# Patient Record
Sex: MALE | Race: WHITE | NOT HISPANIC OR LATINO | Employment: FULL TIME | URBAN - METROPOLITAN AREA
[De-identification: names, ages, dates, MRNs, and addresses within clinical notes are randomized per-mention and may not be internally consistent; named-entity substitution may affect disease eponyms.]

---

## 2021-04-19 ENCOUNTER — THERAPY VISIT (OUTPATIENT)
Dept: PHYSICAL THERAPY | Facility: CLINIC | Age: 36
End: 2021-04-19
Payer: COMMERCIAL

## 2021-04-19 DIAGNOSIS — G89.29 CHRONIC MIDLINE LOW BACK PAIN WITHOUT SCIATICA: ICD-10-CM

## 2021-04-19 DIAGNOSIS — M54.50 CHRONIC MIDLINE LOW BACK PAIN WITHOUT SCIATICA: ICD-10-CM

## 2021-04-19 PROCEDURE — 97161 PT EVAL LOW COMPLEX 20 MIN: CPT | Mod: GP | Performed by: PHYSICAL THERAPIST

## 2021-04-19 PROCEDURE — 97110 THERAPEUTIC EXERCISES: CPT | Mod: GP | Performed by: PHYSICAL THERAPIST

## 2021-04-19 NOTE — LETTER
"[facility]  [address]  [facility] [address]  [phone]    2021    Re: Adriano Khanna   :   1985  MRN:  [id number]   REFERRING PHYSICIAN:   [facility]    Date of Initial Evaluation:  21  Visits: 1  Rxs Used: 1  Reason for Referral:  Chronic midline low back pain without sciatica    Physical Therapy Initial Evaluation  Subjective:  The history is provided by the patient. No  was used.   Patient Health History  Adriano Khanna being seen for low bakc pain.   Problem began: 2021.   Problem occurred: shoveling snow   Pain is reported as 4/10 on pain scale.  General health as reported by patient is poor.  Pertinent medical history includes: overweight and smoking.   Red flags:  None as reported by patient.  Medical allergies: none.   Surgeries include:  None.    Current medications:  Muscle relaxants.    Current occupation is .   Primary job tasks include:  Computer work, driving, lifting/carrying, prolonged sitting, prolonged standing, pushing/pulling and repetitive tasks.                Therapist Generated HPI Evaluation  Problem details: He has had recurrent back pain.  Stated in 2021 with shovel and felt a \"pop\" in his low back.  He had some pain instantly, but pain progressed over the next couple of hours.  That pain lasted 1 week and then went away.  About 2 weeks ago the pain return for unknown reason.  He has been seeing chiro with mild relief.  He feels better if he is able to move around and be active.  Pain is worse with less activity, bending, sitting >10 min.  No issues with sleeping.  Mild pain getting out of bed in AM..    Type of problem:  Lumbar.  This is a recurrent condition.  Condition occurred with:  Insidious onset.  Where condition occurred: for unknown reasons.  Patient reports pain:  Lower lumbar " spine.  Pain is described as burning, sharp and aching and is intermittent.  Pain radiates to:  Gluteals left, gluteals right and thigh right. Pain is worse in the A.M..  Since onset symptoms are unchanged.  Associated symptoms:  Loss of motion/stiffness, tingling and loss of strength. Symptoms are exacerbated by bending, lifting, certain positions, twisting and sitting  and relieved by activity/movement.  Re: Adriano Khanna   :   1985  Page 2    Previous treatment includes chiropractic. There was mild improvement following previous treatment.  Restrictions due to condition include:  Working in normal job without restrictions.  Barriers include:  Stairs.  Pt goal: to improve sitting tolerance                 Objective:  Adriano Khanna , : 1985, MRN: [id number]  Physical Therapy Objective FindingsSubjective information, goals, clinical impression, daily documentation and other information found in EPISODES tab.  Objective:     Lumbar Pain  Posture: initially after sitting: significant  lumbar shift to R, after standing a couple of min: Gait:  After sitting: significant wt lumbar shift shoulders to R, after a couple of minutes, slowed but no lumbar shift  Lumbar Range of Motion:  Flexion                                               60% - pain low back                                                                                                                            Extension 80%   Right Side Bending 70% - pain low back   Left Side Bending 90% - pain low back   Repeated extension- standing Decreasing pain, improving lumbar ext, SB B and flex after 15 reps   Repeated flexion- standing Increasing tightness in low back, loss of lumbar ext and R SB after 10 reps     Pelvic screen:                                                                         Positive                                            Negative                                             Standing Forward Bend  x    Naresh(March)  x   Supine to sit     Sacroiliac provocation test     Pubic symphysis provocation            -resisted hip add at 47     Other:       Hip Screen:                                                                  Positive                                             Negative                                             Hip ROM  x   Scour  x   JEISON  x   FADIR  x   Other:     Manual Muscle Testing (graded 0-5, measured at 0 degrees unless otherwise noted):                                                                              Right                                  Left                                                     Transversus Abdominus     -Rodri Leg Lowering (deg)     Hip Flex L2 4- (+ low back) 4- (+ low back)   Hip Abd     Hip Add     Hip Ext 4- (+ low back) 4- (+ low back)   Knee Flex 5 5   Knee Ext L3 5 5   Ankle Dorsiflexion L4 5 5   Great Toe Extension L5 5 5   Ankle Plantar Flexion S1 5 5   Other:     (+ mild pain, ++ moderate pain, +++ severe pain)    Special Tests:                                                                     Positive                                             Negative                                             Sign of Buttock  x   SLR  x   Trev Test  x   Ely Test  x   Prone instability Test     Crossover SLR     Repeated extension prone Improving lumbar extension, decreased pain with PA L4 after rx    Other:       Flexibility:                                                              Right                                                 Left                                                      Hamstring SLR 75 SLR 75   Hip flexor normal normal   Quadricep normal normal   Zhang's     piriformis     Other:            Segmental Mobility: pain L4>L5>L3  IF symptoms past hip, must do neuro testing  Dermatome/Sensory  Testing: normal to light touch BLE  Reflex Testing:                                                                 Right                                                   Left                                                     Patellar Tendon normal normal   Achilles Tendon normal normal   Babinski       Assessment/Plan:    Patient is a 36 year old male with lumbar complaints.    Patient has the following significant findings with corresponding treatment plan.                Diagnosis 1:  Low back pain consistent with disc irritation L4-L5  Pain -  hot/cold therapy, manual therapy, self management, education, directional preference exercise   Re: Adriano Khanna   :   1985  Page 4    and home program  Decreased ROM/flexibility - manual therapy, therapeutic exercise, therapeutic activity and home program  Decreased strength - therapeutic exercise, therapeutic activities and home program  Decreased function - therapeutic activities and home program  Impaired posture - neuro re-education, therapeutic activities and home program    Therapy Evaluation Codes:   1) History comprised of:   Personal factors that impact the plan of care:      Profession.    Comorbidity factors that impact the plan of care are:      Overweight.     Medications impacting care: Muscle relaxant.  2) Examination of Body Systems comprised of:   Body structures and functions that impact the plan of care:      Lumbar spine.   Activity limitations that impact the plan of care are:      Bending, Driving, Dressing, Lifting, Reading/Computer work and Sitting.  3) Clinical presentation characteristics are:   Evolving/Changing.  4) Decision-Making    Low complexity using standardized patient assessment instrument and/or measureable assessment of functional outcome.  Cumulative Therapy Evaluation is: Low complexity.  Previous and current functional limitations:  (See Goal Flow Sheet for this information)    Short term and Long term goals: (See Goal Flow Sheet for this information)   Communication ability:  Patient appears to be able to clearly communicate and understand verbal and  written communication and follow directions correctly.  Treatment Explanation - The following has been discussed with the patient:   RX ordered/plan of care  Anticipated outcomes  Possible risks and side effects  This patient would benefit from PT intervention to resume normal activities.   Rehab potential is good.  Frequency:  1 X week, once daily  Duration:  for 6 weeks  Discharge Plan:  Achieve all LTG.  Independent in home treatment program.  Reach maximal therapeutic benefit.    Thank you for your referral.    INQUIRIES  Therapist: Keaton Hua,PT, DPT, [address]  [facility] [address]  Phone: [phone]  Fax: [phone]

## 2021-04-19 NOTE — PROGRESS NOTES
"Physical Therapy Initial Evaluation  Subjective:  The history is provided by the patient. No  was used.   Patient Health History  Adriano Khanna being seen for low bakc pain.     Problem began: 2/19/2021.   Problem occurred: shoveling snow   Pain is reported as 4/10 on pain scale.  General health as reported by patient is poor.  Pertinent medical history includes: overweight and smoking.   Red flags:  None as reported by patient.  Medical allergies: none.   Surgeries include:  None.    Current medications:  Muscle relaxants.    Current occupation is .   Primary job tasks include:  Computer work, driving, lifting/carrying, prolonged sitting, prolonged standing, pushing/pulling and repetitive tasks.                  Therapist Generated HPI Evaluation  Problem details: He has had recurrent back pain.  Stated in Feb 2021 with shovel and felt a \"pop\" in his low back.  He had some pain instantly, but pain progressed over the next couple of hours.  That pain lasted 1 week and then went away.  About 2 weeks ago the pain return for unknown reason.  He has been seeing chiro with mild relief.  He feels better if he is able to move around and be active.  Pain is worse with less activity, bending, sitting >10 min.  No issues with sleeping.  Mild pain getting out of bed in AM..         Type of problem:  Lumbar.    This is a recurrent condition.  Condition occurred with:  Insidious onset.  Where condition occurred: for unknown reasons.  Patient reports pain:  Lower lumbar spine.  Pain is described as burning, sharp and aching and is intermittent.  Pain radiates to:  Gluteals left, gluteals right and thigh right. Pain is worse in the A.M..  Since onset symptoms are unchanged.  Associated symptoms:  Loss of motion/stiffness, tingling and loss of strength. Symptoms are exacerbated by bending, lifting, certain positions, twisting and sitting  and relieved by " activity/movement.    Previous treatment includes chiropractic. There was mild improvement following previous treatment.  Restrictions due to condition include:  Working in normal job without restrictions.  Barriers include:  Stairs.       Pt goal: to improve sitting tolerance                 Objective:  System    Physical Exam    General     ROS  Adriano Khanna , : 1985, MRN: [id number]    Physical Therapy Objective Findings  Subjective information, goals, clinical impression, daily documentation and other information found in EPISODES tab.  Objective:     Lumbar Pain    Posture: initially after sitting: significant  lumbar shift to R, after standing a couple of min:   Gait:  After sitting: significant wt lumbar shift shoulders to R, after a couple of minutes, slowed but no lumbar shift  Lumbar Range of Motion:  Flexion                                               60% - pain low back                                                                                                                            Extension 80%   Right Side Bending 70% - pain low back   Left Side Bending 90% - pain low back   Repeated extension- standing Decreasing pain, improving lumbar ext, SB B and flex after 15 reps   Repeated flexion- standing Increasing tightness in low back, loss of lumbar ext and R SB after 10 reps     Pelvic screen:                                                                         Positive                                            Negative                                             Standing Forward Bend  x   Gillet(March)  x   Supine to sit     Sacroiliac provocation test     Pubic symphysis provocation            -resisted hip add at 47     Other:       Hip Screen:                                                                  Positive                                             Negative                                             Hip ROM  x   Scour  x   JEISON  x   FADIR  x   Other:      Manual Muscle Testing (graded 0-5, measured at 0 degrees unless otherwise noted):                                                                              Right                                  Left                                                     Transversus Abdominus     -Rodri Leg Lowering (deg)     Hip Flex L2 4- (+ low back) 4- (+ low back)   Hip Abd     Hip Add     Hip Ext 4- (+ low back) 4- (+ low back)   Knee Flex 5 5   Knee Ext L3 5 5   Ankle Dorsiflexion L4 5 5   Great Toe Extension L5 5 5   Ankle Plantar Flexion S1 5 5   Other:     (+ mild pain, ++ moderate pain, +++ severe pain)    Special Tests:                                                                     Positive                                             Negative                                             Sign of Buttock  x   SLR  x   Trev Test  x   Ely Test  x   Prone instability Test     Crossover SLR     Repeated extension prone Improving lumbar extension, decreased pain with PA L4 after rx    Other:       Flexibility:                                                              Right                                                 Left                                                      Hamstring SLR 75 SLR 75   Hip flexor normal normal   Quadricep normal normal   Zhang's     piriformis     Other:            Segmental Mobility: pain L4>L5>L3    IF symptoms past hip, must do neuro testing    Dermatome/Sensory  Testing: normal to light touch BLE  Reflex Testing:                                                                 Right                                                  Left                                                     Patellar Tendon normal normal   Achilles Tendon normal normal   Babinski         Assessment/Plan:    Patient is a 36 year old male with lumbar complaints.    Patient has the following significant findings with corresponding treatment plan.                Diagnosis 1:  Low back pain consistent with  disc irritation L4-L5  Pain -  hot/cold therapy, manual therapy, self management, education, directional preference exercise and home program  Decreased ROM/flexibility - manual therapy, therapeutic exercise, therapeutic activity and home program  Decreased strength - therapeutic exercise, therapeutic activities and home program  Decreased function - therapeutic activities and home program  Impaired posture - neuro re-education, therapeutic activities and home program    Therapy Evaluation Codes:   1) History comprised of:   Personal factors that impact the plan of care:      Profession.    Comorbidity factors that impact the plan of care are:      Overweight.     Medications impacting care: Muscle relaxant.  2) Examination of Body Systems comprised of:   Body structures and functions that impact the plan of care:      Lumbar spine.   Activity limitations that impact the plan of care are:      Bending, Driving, Dressing, Lifting, Reading/Computer work and Sitting.  3) Clinical presentation characteristics are:   Evolving/Changing.  4) Decision-Making    Low complexity using standardized patient assessment instrument and/or measureable assessment of functional outcome.  Cumulative Therapy Evaluation is: Low complexity.    Previous and current functional limitations:  (See Goal Flow Sheet for this information)    Short term and Long term goals: (See Goal Flow Sheet for this information)     Communication ability:  Patient appears to be able to clearly communicate and understand verbal and written communication and follow directions correctly.  Treatment Explanation - The following has been discussed with the patient:   RX ordered/plan of care  Anticipated outcomes  Possible risks and side effects  This patient would benefit from PT intervention to resume normal activities.   Rehab potential is good.    Frequency:  1 X week, once daily  Duration:  for 6 weeks  Discharge Plan:  Achieve all LTG.  Independent in home  treatment program.  Reach maximal therapeutic benefit.    Please refer to the daily flowsheet for treatment today, total treatment time and time spent performing 1:1 timed codes.     Keaton Hua,PT, DPT, OCS

## 2021-04-29 ENCOUNTER — THERAPY VISIT (OUTPATIENT)
Dept: PHYSICAL THERAPY | Facility: CLINIC | Age: 36
End: 2021-04-29
Payer: COMMERCIAL

## 2021-04-29 DIAGNOSIS — M54.50 CHRONIC MIDLINE LOW BACK PAIN WITHOUT SCIATICA: ICD-10-CM

## 2021-04-29 DIAGNOSIS — G89.29 CHRONIC MIDLINE LOW BACK PAIN WITHOUT SCIATICA: ICD-10-CM

## 2021-04-29 PROCEDURE — 97112 NEUROMUSCULAR REEDUCATION: CPT | Mod: GP | Performed by: PHYSICAL THERAPIST

## 2021-04-29 PROCEDURE — 97110 THERAPEUTIC EXERCISES: CPT | Mod: GP | Performed by: PHYSICAL THERAPIST

## 2021-04-29 PROCEDURE — 97140 MANUAL THERAPY 1/> REGIONS: CPT | Mod: GP | Performed by: PHYSICAL THERAPIST

## 2021-05-06 ENCOUNTER — THERAPY VISIT (OUTPATIENT)
Dept: PHYSICAL THERAPY | Facility: CLINIC | Age: 36
End: 2021-05-06
Payer: COMMERCIAL

## 2021-05-06 DIAGNOSIS — G89.29 CHRONIC MIDLINE LOW BACK PAIN WITHOUT SCIATICA: ICD-10-CM

## 2021-05-06 DIAGNOSIS — M54.50 CHRONIC MIDLINE LOW BACK PAIN WITHOUT SCIATICA: ICD-10-CM

## 2021-05-06 PROCEDURE — 97110 THERAPEUTIC EXERCISES: CPT | Mod: GP | Performed by: PHYSICAL THERAPIST

## 2021-05-06 PROCEDURE — 97140 MANUAL THERAPY 1/> REGIONS: CPT | Mod: GP | Performed by: PHYSICAL THERAPIST

## 2021-05-06 PROCEDURE — 97112 NEUROMUSCULAR REEDUCATION: CPT | Mod: GP | Performed by: PHYSICAL THERAPIST

## 2021-05-14 ENCOUNTER — THERAPY VISIT (OUTPATIENT)
Dept: PHYSICAL THERAPY | Facility: CLINIC | Age: 36
End: 2021-05-14
Payer: COMMERCIAL

## 2021-05-14 DIAGNOSIS — M54.50 CHRONIC MIDLINE LOW BACK PAIN WITHOUT SCIATICA: ICD-10-CM

## 2021-05-14 DIAGNOSIS — G89.29 CHRONIC MIDLINE LOW BACK PAIN WITHOUT SCIATICA: ICD-10-CM

## 2021-05-14 PROCEDURE — 97110 THERAPEUTIC EXERCISES: CPT | Performed by: PHYSICAL THERAPY ASSISTANT

## 2021-05-14 PROCEDURE — 97112 NEUROMUSCULAR REEDUCATION: CPT | Performed by: PHYSICAL THERAPY ASSISTANT

## 2021-05-28 ENCOUNTER — THERAPY VISIT (OUTPATIENT)
Dept: PHYSICAL THERAPY | Facility: CLINIC | Age: 36
End: 2021-05-28
Payer: COMMERCIAL

## 2021-05-28 DIAGNOSIS — G89.29 CHRONIC MIDLINE LOW BACK PAIN WITHOUT SCIATICA: ICD-10-CM

## 2021-05-28 DIAGNOSIS — M54.50 CHRONIC MIDLINE LOW BACK PAIN WITHOUT SCIATICA: ICD-10-CM

## 2021-05-28 PROCEDURE — 97530 THERAPEUTIC ACTIVITIES: CPT | Mod: GP | Performed by: PHYSICAL THERAPIST

## 2021-05-28 PROCEDURE — 97140 MANUAL THERAPY 1/> REGIONS: CPT | Mod: GP | Performed by: PHYSICAL THERAPIST

## 2021-05-28 PROCEDURE — 97110 THERAPEUTIC EXERCISES: CPT | Mod: GP | Performed by: PHYSICAL THERAPIST

## 2021-06-11 ENCOUNTER — THERAPY VISIT (OUTPATIENT)
Dept: PHYSICAL THERAPY | Facility: CLINIC | Age: 36
End: 2021-06-11
Payer: COMMERCIAL

## 2021-06-11 DIAGNOSIS — M54.50 CHRONIC MIDLINE LOW BACK PAIN WITHOUT SCIATICA: ICD-10-CM

## 2021-06-11 DIAGNOSIS — G89.29 CHRONIC MIDLINE LOW BACK PAIN WITHOUT SCIATICA: ICD-10-CM

## 2021-06-11 PROCEDURE — 97110 THERAPEUTIC EXERCISES: CPT | Mod: GP | Performed by: PHYSICAL THERAPY ASSISTANT

## 2021-06-11 PROCEDURE — 97140 MANUAL THERAPY 1/> REGIONS: CPT | Mod: GP | Performed by: PHYSICAL THERAPY ASSISTANT

## 2021-06-11 PROCEDURE — 97530 THERAPEUTIC ACTIVITIES: CPT | Mod: GP | Performed by: PHYSICAL THERAPY ASSISTANT

## 2021-06-11 NOTE — LETTER
[facility]  [address]  [employer] [address] [phone]    2021    Re: Smith Khanna   :   1985  MRN:  [id number]   REFERRING PHYSICIAN:   Mckay MARTINEZ [facility]    Date of Initial Evaluation:  21  Visits: 6  Rxs Used: 6  Reason for Referral:  Chronic midline low back pain without sciatica    DISCHARGE REPORT    Progress reporting period is from 21 to 6/10/21.      SUBJECTIVE    Subjective changes noted by patient:   Pt has been seen for 6 PT sessions, reporting 90% improved. Symptoms of tightness in low back, no longer with radicular symptoms.    Current Pain level: 1/10    Initial Pain level: 9/10   Changes in function:  Yes (See Goal flowsheet attached for changes in current functional level)     Adverse reaction to treatment or activity: None     OBJECTIVE    Changes noted in objective findings:    LROM: reach to ankles, ext min loss, R/L SG and SG WNL.   Abdominal strength 4-/5.   Pt able to perform crate lift test up to 60# without pain. Prompting needed for correct sitting posture, once corrected pt maintained.   Oswestry has improved from 34-10%, Joon has improved from 6 to 2 (low risk).      ASSESSMENT/PLAN    Updated problem list and treatment plan: Diagnosis 1:  Chronic LBP  Pain -  home program  Decreased ROM/flexibility - home program  Decreased function - home program  Impaired posture - home program  STG/LTGs have been met:  Yes (See Goal flow sheet completed today.)  Progress toward STG/LTGs have been made:  Yes (See Goal flow sheet completed today.)      Re: Smith Khanna   :   1985  Page 2      Assessment of Progress: The patient has met all of their long term goals.  Self Management Plans:  Patient is independent in a home treatment program.  Patient is independent in self management of symptoms.  I have re-evaluated this patient and find that the  nature, scope, duration and intensity of the therapy is appropriate for the medical condition of the patient.  Smith continues to require the following intervention to meet STG and LT's:  PT intervention is no longer required to meet STG/LTG.    Recommendations:  This patient is ready to be discharged from therapy and continue their home treatment program.  The progress note/discharge summary was written in collaboration with and reviewed by the physical therapist.      Thank you for your referral.    INQUIRIES    Therapist: Keaton Hua,PT, DPT, [address]  [facility] [address]  Phone: [phone]  Fax: [phone]

## 2021-06-11 NOTE — PROGRESS NOTES
DISCHARGE REPORT    Progress reporting period is from 4/19/21 to 6/10/21.      SUBJECTIVE  Subjective changes noted by patient:   Pt has been seen for 6 PT sessions, reporting 90% improved. Symptoms of tightness in low back, no longer with radicular symptoms.    Current Pain level: 1/10    Initial Pain level: 9/10   Changes in function:  Yes (See Goal flowsheet attached for changes in current functional level)     Adverse reaction to treatment or activity: None     OBJECTIVE  Changes noted in objective findings:    LROM: reach to ankles, ext min loss, R/L SG and SG WNL.   Abdominal strength 4-/5.   Pt able to perform crate lift test up to 60# without pain. Prompting needed for correct sitting posture, once corrected pt maintained.   Oswestry has improved from 34-10%, Joon has improved from 6 to 2 (low risk).          ASSESSMENT/PLAN  Updated problem list and treatment plan: Diagnosis 1:  Chronic LBP  Pain -  home program  Decreased ROM/flexibility - home program  Decreased function - home program  Impaired posture - home program  STG/LTGs have been met:  Yes (See Goal flow sheet completed today.)  Progress toward STG/LTGs have been made:  Yes (See Goal flow sheet completed today.)  Assessment of Progress: The patient has met all of their long term goals.  Self Management Plans:  Patient is independent in a home treatment program.  Patient is independent in self management of symptoms.  I have re-evaluated this patient and find that the nature, scope, duration and intensity of the therapy is appropriate for the medical condition of the patient.  Smith continues to require the following intervention to meet STG and LT's:  PT intervention is no longer required to meet STG/LTG.    Recommendations:  This patient is ready to be discharged from therapy and continue their home treatment program.  The progress note/discharge summary was written in collaboration with and reviewed by the physical therapist.    Please refer  to the daily flowsheet for treatment today, total treatment time and time spent performing 1:1 timed codes.      Keaton Hua,PT, DPT, OCS

## 2021-06-14 PROBLEM — G89.29 CHRONIC MIDLINE LOW BACK PAIN WITHOUT SCIATICA: Status: RESOLVED | Noted: 2021-04-19 | Resolved: 2021-06-14

## 2021-06-14 PROBLEM — M54.50 CHRONIC MIDLINE LOW BACK PAIN WITHOUT SCIATICA: Status: RESOLVED | Noted: 2021-04-19 | Resolved: 2021-06-14

## 2022-04-03 ENCOUNTER — HEALTH MAINTENANCE LETTER (OUTPATIENT)
Age: 37
End: 2022-04-03

## 2022-05-13 ENCOUNTER — TRANSCRIBE ORDERS (OUTPATIENT)
Dept: OTHER | Age: 37
End: 2022-05-13

## 2022-05-13 DIAGNOSIS — G89.29 CHRONIC BILATERAL LOW BACK PAIN WITH RIGHT-SIDED SCIATICA: Primary | ICD-10-CM

## 2022-05-13 DIAGNOSIS — M54.41 CHRONIC BILATERAL LOW BACK PAIN WITH RIGHT-SIDED SCIATICA: Primary | ICD-10-CM

## 2022-05-13 DIAGNOSIS — M47.816 LUMBAR SPONDYLOSIS: ICD-10-CM

## 2022-05-16 ENCOUNTER — THERAPY VISIT (OUTPATIENT)
Dept: PHYSICAL THERAPY | Facility: CLINIC | Age: 37
End: 2022-05-16
Attending: PHYSICIAN ASSISTANT
Payer: COMMERCIAL

## 2022-05-16 DIAGNOSIS — M54.16 LUMBAR RADICULOPATHY: ICD-10-CM

## 2022-05-16 DIAGNOSIS — M54.41 CHRONIC BILATERAL LOW BACK PAIN WITH RIGHT-SIDED SCIATICA: ICD-10-CM

## 2022-05-16 DIAGNOSIS — M47.816 LUMBAR SPONDYLOSIS: ICD-10-CM

## 2022-05-16 DIAGNOSIS — G89.29 CHRONIC BILATERAL LOW BACK PAIN WITH RIGHT-SIDED SCIATICA: ICD-10-CM

## 2022-05-16 PROCEDURE — 97112 NEUROMUSCULAR REEDUCATION: CPT | Mod: GP | Performed by: PHYSICAL THERAPIST

## 2022-05-16 PROCEDURE — 97110 THERAPEUTIC EXERCISES: CPT | Mod: GP | Performed by: PHYSICAL THERAPIST

## 2022-05-16 PROCEDURE — 97162 PT EVAL MOD COMPLEX 30 MIN: CPT | Mod: GP | Performed by: PHYSICAL THERAPIST

## 2022-05-16 NOTE — PROGRESS NOTES
Physical Therapy Initial Evaluation  Subjective:  The history is provided by the patient. No  was used.   Patient Health History  Smith Khanna being seen for Low back and B leg pain.     Date of Onset: April 2022.   Problem occurred: most recent onset of B LE pain about 1 month ago; Had back pain starting about a year ago, but feels LE pain is different now   Pain is reported as 3/10 on pain scale.  General health as reported by patient is poor.  Pertinent medical history includes: depression, numbness/tingling, overweight and smoking.     Medical allergies: none.   Surgeries include:  None.    Current medications:  Anti-inflammatory.    Current occupation is  [employer] ([employer]); Lives at home in multi level home .   Primary job tasks include:  Computer work, lifting/carrying, prolonged sitting and repetitive tasks.                  Therapist Generated HPI Evaluation         Type of problem:  Lumbar.    This is a chronic (chronic low back pain; New LE pain) condition.  Condition occurred with:  Insidious onset.  Where condition occurred: for unknown reasons.  Patient reports pain:  Lower lumbar spine and lumbar spine left.  Pain is described as shooting, burning and sharp and is intermittent.  Pain radiates to:  Gluteals left, gluteals right, thigh left, thigh right, knee left, knee right, lower leg left, lower leg right and foot left. Pain is worse in the P.M..  Since onset symptoms are gradually worsening.  Associated symptoms:  Tingling and loss of motion/stiffness. Symptoms are exacerbated by bending, sitting and lying down  and relieved by activity/movement, heat, ice, NSAID's and analgesics (standing).  Special tests included:  MRI (Per pt: L3-4 and L4-5 disc herniations).  Previous treatment includes physical therapy. There was significant improvement following previous treatment.  Restrictions due to condition include:  Working in normal job without  restrictions.  Barriers include:  None as reported by patient.                        Objective:  Standing Alignment:    Cervical/Thoracic:  Forward head and thoracic kyphosis increased    Lumbar:  Posterior pelvic tilt                Flexibility/Screens:       Lower Extremity:  Decreased left lower extremity flexibility:Gastroc    Decreased right lower extremity flexibility:  Gastroc               Lumbar/SI Evaluation  ROM:    AROM Lumbar:   Flexion:            Mid lower +pain low back central and L  Ext:                    Mod limitation   Side Bend:        Left:  Lower thigh +pain L    Right:  Lower thigh  Rotation:           Left:  Min limitation (compensation through LE)    Right:  Min limitation (compensation through LE)  Side Glide:        Left:     Right:           Lumbar Myotomes:  Lumbar myotomes: all levels WNL/EQ B when tested in sitting.                  Neural Tension/Mobility:    Left side:  Slump positive.  Left side:SLR or SLR w/DF  negative.   Right side:   Slump positive.  Right side:   SLR w/DF or SLR  negative.     Functional Tests:  Core strength and proprioception lumbar: difficulty flexing through hips when squatting (increased lumbar flex) and increased lumbar flex w/ transfers to sitting.                                                     Decreased pain w/ slump after repeated extension in prone  No pain w/ slump after gastroc stretch  Radicular pain w/ sitting in chair still present  Some better w/ lumbar posture support    General     ROS    Assessment/Plan:    Patient is a 37 year old male with lumbar complaints.    Patient has the following significant findings with corresponding treatment plan.                Diagnosis 1:  Low back pain w/ B sciatica    Pain -  hot/cold therapy, electric stimulation, mechanical traction, manual therapy, splint/taping/bracing/orthotics, self management, education, directional preference exercise and home program  Decreased ROM/flexibility - manual  therapy, therapeutic exercise, therapeutic activity and home program  Impaired muscle performance - neuro re-education and home program  Decreased function - therapeutic activities and home program  Impaired posture - neuro re-education, therapeutic activities and home program    Therapy Evaluation Codes:   1) History comprised of:   Personal factors that impact the plan of care:      Past/current experiences, Profession and Time since onset of symptoms.    Comorbidity factors that impact the plan of care are:      Depression, Numbness/tingling, Overweight and Smoking.     Medications impacting care: Anti-inflammatory.  2) Examination of Body Systems comprised of:   Body structures and functions that impact the plan of care:      Lumbar spine.   Activity limitations that impact the plan of care are:      Bending, Driving, Lifting, Sitting, Squatting/kneeling, Working, Sleeping and Laying down.  3) Clinical presentation characteristics are:   Evolving/Changing.  4) Decision-Making    Moderate complexity using standardized patient assessment instrument and/or measureable assessment of functional outcome.  Cumulative Therapy Evaluation is: Moderate complexity.    Previous and current functional limitations:  (See Goal Flow Sheet for this information)    Short term and Long term goals: (See Goal Flow Sheet for this information)     Communication ability:  Patient appears to be able to clearly communicate and understand verbal and written communication and follow directions correctly.  Treatment Explanation - The following has been discussed with the patient:   RX ordered/plan of care  Anticipated outcomes  Possible risks and side effects  This patient would benefit from PT intervention to resume normal activities.   Rehab potential is good.    Frequency:  1 X week, once daily  Duration:  for 6 weeks  Discharge Plan:  Achieve all LTG.  Independent in home treatment program.  Reach maximal therapeutic benefit.    Please  refer to the daily flowsheet for treatment today, total treatment time and time spent performing 1:1 timed codes.

## 2022-05-17 PROBLEM — M54.41 CHRONIC BILATERAL LOW BACK PAIN WITH RIGHT-SIDED SCIATICA: Status: ACTIVE | Noted: 2021-04-19

## 2022-05-17 PROBLEM — M54.16 LUMBAR RADICULOPATHY: Status: ACTIVE | Noted: 2022-05-17

## 2022-05-17 PROBLEM — M47.816 LUMBAR SPONDYLOSIS: Status: ACTIVE | Noted: 2022-05-17

## 2022-05-26 ENCOUNTER — THERAPY VISIT (OUTPATIENT)
Dept: PHYSICAL THERAPY | Facility: CLINIC | Age: 37
End: 2022-05-26
Payer: COMMERCIAL

## 2022-05-26 DIAGNOSIS — M47.816 LUMBAR SPONDYLOSIS: Primary | ICD-10-CM

## 2022-05-26 DIAGNOSIS — M54.41 CHRONIC BILATERAL LOW BACK PAIN WITH RIGHT-SIDED SCIATICA: ICD-10-CM

## 2022-05-26 DIAGNOSIS — M54.16 LUMBAR RADICULOPATHY: ICD-10-CM

## 2022-05-26 DIAGNOSIS — G89.29 CHRONIC BILATERAL LOW BACK PAIN WITH RIGHT-SIDED SCIATICA: ICD-10-CM

## 2022-05-26 PROCEDURE — 97110 THERAPEUTIC EXERCISES: CPT | Mod: GP | Performed by: PHYSICAL THERAPIST

## 2022-05-26 PROCEDURE — 97112 NEUROMUSCULAR REEDUCATION: CPT | Mod: GP | Performed by: PHYSICAL THERAPIST

## 2022-05-26 PROCEDURE — 97140 MANUAL THERAPY 1/> REGIONS: CPT | Mod: GP | Performed by: PHYSICAL THERAPIST

## 2022-06-02 ENCOUNTER — THERAPY VISIT (OUTPATIENT)
Dept: PHYSICAL THERAPY | Facility: CLINIC | Age: 37
End: 2022-06-02
Payer: COMMERCIAL

## 2022-06-02 DIAGNOSIS — M47.816 LUMBAR SPONDYLOSIS: Primary | ICD-10-CM

## 2022-06-02 DIAGNOSIS — M54.16 LUMBAR RADICULOPATHY: ICD-10-CM

## 2022-06-02 DIAGNOSIS — M54.41 CHRONIC BILATERAL LOW BACK PAIN WITH RIGHT-SIDED SCIATICA: ICD-10-CM

## 2022-06-02 DIAGNOSIS — G89.29 CHRONIC BILATERAL LOW BACK PAIN WITH RIGHT-SIDED SCIATICA: ICD-10-CM

## 2022-06-02 PROCEDURE — 97110 THERAPEUTIC EXERCISES: CPT | Mod: GP | Performed by: PHYSICAL THERAPIST

## 2022-06-02 PROCEDURE — 97112 NEUROMUSCULAR REEDUCATION: CPT | Mod: GP | Performed by: PHYSICAL THERAPIST

## 2022-06-02 PROCEDURE — 97140 MANUAL THERAPY 1/> REGIONS: CPT | Mod: GP | Performed by: PHYSICAL THERAPIST

## 2022-06-07 ENCOUNTER — THERAPY VISIT (OUTPATIENT)
Dept: PHYSICAL THERAPY | Facility: CLINIC | Age: 37
End: 2022-06-07
Payer: COMMERCIAL

## 2022-06-07 DIAGNOSIS — M47.816 LUMBAR SPONDYLOSIS: Primary | ICD-10-CM

## 2022-06-07 DIAGNOSIS — M54.16 LUMBAR RADICULOPATHY: ICD-10-CM

## 2022-06-07 DIAGNOSIS — M54.41 CHRONIC BILATERAL LOW BACK PAIN WITH RIGHT-SIDED SCIATICA: ICD-10-CM

## 2022-06-07 DIAGNOSIS — G89.29 CHRONIC BILATERAL LOW BACK PAIN WITH RIGHT-SIDED SCIATICA: ICD-10-CM

## 2022-06-07 PROCEDURE — 97110 THERAPEUTIC EXERCISES: CPT | Mod: GP | Performed by: PHYSICAL THERAPY ASSISTANT

## 2022-06-07 PROCEDURE — 97140 MANUAL THERAPY 1/> REGIONS: CPT | Mod: GP | Performed by: PHYSICAL THERAPY ASSISTANT

## 2022-06-15 ENCOUNTER — THERAPY VISIT (OUTPATIENT)
Dept: PHYSICAL THERAPY | Facility: CLINIC | Age: 37
End: 2022-06-15
Payer: COMMERCIAL

## 2022-06-15 DIAGNOSIS — M47.816 LUMBAR SPONDYLOSIS: Primary | ICD-10-CM

## 2022-06-15 DIAGNOSIS — M54.41 CHRONIC BILATERAL LOW BACK PAIN WITH RIGHT-SIDED SCIATICA: ICD-10-CM

## 2022-06-15 DIAGNOSIS — M54.16 LUMBAR RADICULOPATHY: ICD-10-CM

## 2022-06-15 DIAGNOSIS — G89.29 CHRONIC BILATERAL LOW BACK PAIN WITH RIGHT-SIDED SCIATICA: ICD-10-CM

## 2022-06-15 PROCEDURE — 97110 THERAPEUTIC EXERCISES: CPT | Mod: GP | Performed by: PHYSICAL THERAPY ASSISTANT

## 2022-06-15 PROCEDURE — 97140 MANUAL THERAPY 1/> REGIONS: CPT | Mod: GP | Performed by: PHYSICAL THERAPY ASSISTANT

## 2022-06-29 ENCOUNTER — THERAPY VISIT (OUTPATIENT)
Dept: PHYSICAL THERAPY | Facility: CLINIC | Age: 37
End: 2022-06-29
Payer: COMMERCIAL

## 2022-06-29 DIAGNOSIS — G89.29 CHRONIC BILATERAL LOW BACK PAIN WITH RIGHT-SIDED SCIATICA: ICD-10-CM

## 2022-06-29 DIAGNOSIS — M47.816 LUMBAR SPONDYLOSIS: Primary | ICD-10-CM

## 2022-06-29 DIAGNOSIS — M54.16 LUMBAR RADICULOPATHY: ICD-10-CM

## 2022-06-29 DIAGNOSIS — M54.41 CHRONIC BILATERAL LOW BACK PAIN WITH RIGHT-SIDED SCIATICA: ICD-10-CM

## 2022-06-29 PROCEDURE — 97140 MANUAL THERAPY 1/> REGIONS: CPT | Mod: GP | Performed by: PHYSICAL THERAPIST

## 2022-06-29 PROCEDURE — 97110 THERAPEUTIC EXERCISES: CPT | Mod: GP | Performed by: PHYSICAL THERAPIST

## 2022-06-29 PROCEDURE — 97112 NEUROMUSCULAR REEDUCATION: CPT | Mod: GP | Performed by: PHYSICAL THERAPIST

## 2022-06-29 NOTE — PROGRESS NOTES
"DISCHARGE REPORT    Progress reporting period is from 5/16/22 to 6/29/22.       SUBJECTIVE  Pt notes he gets mild \"pulsing\" pain in L posterior thigh if sitting too long. Otherwise radicular pain has been less. Does have some ache in low back when walking around at work, depending on activity level. Feels overall low back pain has been manageable w/ HEP and plans to continue independently for now.    Current Pain level: 0/10 (0/10 in legs, 1-2/10 in low back).     Initial Pain level: 3/10.   Changes in function:  Yes (See Goal flowsheet attached for changes in current functional level)  Adverse reaction to treatment or activity: None    OBJECTIVE  Lumbar AROM: Flex to ankles +pull L LE, minimal reverse of lumbar curve; Ext 50%, SB to knee B;   Lower Abdominal MMT: 3-/5;  (+) SLR L (at 52 deg)  Tender/hypertonic QL L>R  Oswestry Score: 12 % (improved from initial score of 20%)        ASSESSMENT/PLAN  Updated problem list and treatment plan: Diagnosis 1:  Low back pain w/ B sciatica    Pain -  self management and home program  Decreased ROM/flexibility - home program  Impaired muscle performance - home program  Decreased function - home program  Impaired posture - home program    STG/LTGs have been met or progress has been made towards goals:  Yes (See Goal flow sheet completed today.)  Assessment of Progress: The patient has met all of their long term goals.  Self Management Plans:  Patient is independent in a home treatment program.  Patient is independent in self management of symptoms.  I have re-evaluated this patient and find that the nature, scope, duration and intensity of the therapy is appropriate for the medical condition of the patient.  Smith continues to require the following intervention to meet STG and LTG's:  PT intervention is no longer required to meet STG/LTG.    Recommendations:  This patient is ready to be discharged from therapy and continue their home treatment program.    Please refer to the " daily flowsheet for treatment today, total treatment time and time spent performing 1:1 timed codes.

## 2022-06-29 NOTE — LETTER
"[facility]  [address]  [facility] [address] [phone]    2022    Re: Smith RUSSO Jey   :   1985  MRN:  [id number]   REFERRING PHYSICIAN:   Mckay MARTINEZ [facility]    Date of Initial Evaluation:  2022  Visits:  Rxs Used: 6  Reason for Referral:     Lumbar spondylosis  Chronic bilateral low back pain with right-sided sciatica  Lumbar radiculopathy    EVALUATION SUMMARY    DISCHARGE REPORT    Progress reporting period is from 22 to 22.       SUBJECTIVE  Pt notes he gets mild \"pulsing\" pain in L posterior thigh if sitting too long. Otherwise radicular pain has been less. Does have some ache in low back when walking around at work, depending on activity level. Feels overall low back pain has been manageable w/ HEP and plans to continue independently for now.    Current Pain level: 0/10 (0/10 in legs, 1-2/10 in low back).     Initial Pain level: 3/10.   Changes in function:  Yes (See Goal flowsheet attached for changes in current functional level)  Adverse reaction to treatment or activity: None    OBJECTIVE  Lumbar AROM: Flex to ankles +pull L LE, minimal reverse of lumbar curve; Ext 50%, SB to knee B;   Lower Abdominal MMT: 3-/5;  (+) SLR L (at 52 deg)  Tender/hypertonic QL L>R  Oswestry Score: 12 % (improved from initial score of 20%)        ASSESSMENT/PLAN  Updated problem list and treatment plan: Diagnosis 1:  Low back pain w/ B sciatica    Pain -  self management and home program  Decreased ROM/flexibility - home program  Impaired muscle performance - home program  Decreased function - home program  Impaired posture - home program  STG/LTGs have been met or progress has been made towards goals:  Yes (See Goal flow sheet completed today.)  Re: Smith L Jey   :   1985    Assessment of Progress: The patient has met all of their long term goals.  Self " Management Plans:  Patient is independent in a home treatment program.  Patient is independent in self management of symptoms.  I have re-evaluated this patient and find that the nature, scope, duration and intensity of the therapy is appropriate for the medical condition of the patient.  Smith continues to require the following intervention to meet STG and LTG's:  PT intervention is no longer required to meet STG/LTG.    Recommendations:  This patient is ready to be discharged from therapy and continue their home treatment program.    Thank you for your referral.    INQUIRIES  Therapist: Amanda Hilligoss, PT,  DPT  [address] [address]  Phone: [phone]  Fax: [phone]

## 2022-10-03 ENCOUNTER — HEALTH MAINTENANCE LETTER (OUTPATIENT)
Age: 37
End: 2022-10-03

## 2023-05-20 ENCOUNTER — HEALTH MAINTENANCE LETTER (OUTPATIENT)
Age: 38
End: 2023-05-20

## 2024-07-27 ENCOUNTER — HEALTH MAINTENANCE LETTER (OUTPATIENT)
Age: 39
End: 2024-07-27